# Patient Record
Sex: FEMALE | Race: OTHER | NOT HISPANIC OR LATINO
[De-identification: names, ages, dates, MRNs, and addresses within clinical notes are randomized per-mention and may not be internally consistent; named-entity substitution may affect disease eponyms.]

---

## 2024-02-13 ENCOUNTER — NON-APPOINTMENT (OUTPATIENT)
Age: 29
End: 2024-02-13

## 2024-02-14 ENCOUNTER — APPOINTMENT (OUTPATIENT)
Dept: OTOLARYNGOLOGY | Facility: CLINIC | Age: 29
End: 2024-02-14

## 2024-09-16 ENCOUNTER — TRANSCRIPTION ENCOUNTER (OUTPATIENT)
Age: 29
End: 2024-09-16

## 2024-09-17 ENCOUNTER — INPATIENT (INPATIENT)
Facility: HOSPITAL | Age: 29
LOS: 0 days | Discharge: HOME | DRG: 770 | End: 2024-09-18
Attending: OBSTETRICS & GYNECOLOGY | Admitting: OBSTETRICS & GYNECOLOGY
Payer: COMMERCIAL

## 2024-09-17 ENCOUNTER — TRANSCRIPTION ENCOUNTER (OUTPATIENT)
Age: 29
End: 2024-09-17

## 2024-09-17 VITALS
SYSTOLIC BLOOD PRESSURE: 112 MMHG | RESPIRATION RATE: 18 BRPM | HEART RATE: 76 BPM | TEMPERATURE: 98 F | WEIGHT: 100.09 LBS | DIASTOLIC BLOOD PRESSURE: 75 MMHG | OXYGEN SATURATION: 99 %

## 2024-09-17 VITALS
RESPIRATION RATE: 18 BRPM | OXYGEN SATURATION: 99 % | DIASTOLIC BLOOD PRESSURE: 61 MMHG | SYSTOLIC BLOOD PRESSURE: 104 MMHG | HEART RATE: 80 BPM

## 2024-09-17 LAB
ALBUMIN SERPL ELPH-MCNC: 4.9 G/DL — SIGNIFICANT CHANGE UP (ref 3.3–5)
ALP SERPL-CCNC: 68 U/L — SIGNIFICANT CHANGE UP (ref 40–120)
ALT FLD-CCNC: 10 U/L — SIGNIFICANT CHANGE UP (ref 10–45)
ANION GAP SERPL CALC-SCNC: 12 MMOL/L — SIGNIFICANT CHANGE UP (ref 5–17)
APTT BLD: 34 SEC — SIGNIFICANT CHANGE UP (ref 24.5–35.6)
AST SERPL-CCNC: 15 U/L — SIGNIFICANT CHANGE UP (ref 10–40)
BASOPHILS # BLD AUTO: 0.05 K/UL — SIGNIFICANT CHANGE UP (ref 0–0.2)
BASOPHILS NFR BLD AUTO: 0.6 % — SIGNIFICANT CHANGE UP (ref 0–2)
BILIRUB SERPL-MCNC: 0.2 MG/DL — SIGNIFICANT CHANGE UP (ref 0.2–1.2)
BUN SERPL-MCNC: 10 MG/DL — SIGNIFICANT CHANGE UP (ref 7–23)
CALCIUM SERPL-MCNC: 9.7 MG/DL — SIGNIFICANT CHANGE UP (ref 8.4–10.5)
CHLORIDE SERPL-SCNC: 102 MMOL/L — SIGNIFICANT CHANGE UP (ref 96–108)
CO2 SERPL-SCNC: 26 MMOL/L — SIGNIFICANT CHANGE UP (ref 22–31)
CREAT SERPL-MCNC: 0.72 MG/DL — SIGNIFICANT CHANGE UP (ref 0.5–1.3)
EGFR: 117 ML/MIN/1.73M2 — SIGNIFICANT CHANGE UP
EOSINOPHIL # BLD AUTO: 0.45 K/UL — SIGNIFICANT CHANGE UP (ref 0–0.5)
EOSINOPHIL NFR BLD AUTO: 5.4 % — SIGNIFICANT CHANGE UP (ref 0–6)
GLUCOSE SERPL-MCNC: 95 MG/DL — SIGNIFICANT CHANGE UP (ref 70–99)
HCG SERPL-ACNC: 220 MIU/ML — HIGH
HCT VFR BLD CALC: 36.7 % — SIGNIFICANT CHANGE UP (ref 34.5–45)
HGB BLD-MCNC: 12 G/DL — SIGNIFICANT CHANGE UP (ref 11.5–15.5)
IMM GRANULOCYTES NFR BLD AUTO: 0.8 % — SIGNIFICANT CHANGE UP (ref 0–0.9)
INR BLD: 1 — SIGNIFICANT CHANGE UP (ref 0.85–1.18)
LYMPHOCYTES # BLD AUTO: 2.33 K/UL — SIGNIFICANT CHANGE UP (ref 1–3.3)
LYMPHOCYTES # BLD AUTO: 27.8 % — SIGNIFICANT CHANGE UP (ref 13–44)
MCHC RBC-ENTMCNC: 28.9 PG — SIGNIFICANT CHANGE UP (ref 27–34)
MCHC RBC-ENTMCNC: 32.7 GM/DL — SIGNIFICANT CHANGE UP (ref 32–36)
MCV RBC AUTO: 88.4 FL — SIGNIFICANT CHANGE UP (ref 80–100)
MONOCYTES # BLD AUTO: 0.67 K/UL — SIGNIFICANT CHANGE UP (ref 0–0.9)
MONOCYTES NFR BLD AUTO: 8 % — SIGNIFICANT CHANGE UP (ref 2–14)
NEUTROPHILS # BLD AUTO: 4.81 K/UL — SIGNIFICANT CHANGE UP (ref 1.8–7.4)
NEUTROPHILS NFR BLD AUTO: 57.4 % — SIGNIFICANT CHANGE UP (ref 43–77)
NRBC # BLD: 0 /100 WBCS — SIGNIFICANT CHANGE UP (ref 0–0)
PLATELET # BLD AUTO: 317 K/UL — SIGNIFICANT CHANGE UP (ref 150–400)
POTASSIUM SERPL-MCNC: 3.9 MMOL/L — SIGNIFICANT CHANGE UP (ref 3.5–5.3)
POTASSIUM SERPL-SCNC: 3.9 MMOL/L — SIGNIFICANT CHANGE UP (ref 3.5–5.3)
PROT SERPL-MCNC: 8.1 G/DL — SIGNIFICANT CHANGE UP (ref 6–8.3)
PROTHROM AB SERPL-ACNC: 11.4 SEC — SIGNIFICANT CHANGE UP (ref 9.5–13)
RBC # BLD: 4.15 M/UL — SIGNIFICANT CHANGE UP (ref 3.8–5.2)
RBC # FLD: 12.6 % — SIGNIFICANT CHANGE UP (ref 10.3–14.5)
SODIUM SERPL-SCNC: 140 MMOL/L — SIGNIFICANT CHANGE UP (ref 135–145)
WBC # BLD: 8.38 K/UL — SIGNIFICANT CHANGE UP (ref 3.8–10.5)
WBC # FLD AUTO: 8.38 K/UL — SIGNIFICANT CHANGE UP (ref 3.8–10.5)

## 2024-09-17 PROCEDURE — 76830 TRANSVAGINAL US NON-OB: CPT | Mod: 26

## 2024-09-17 PROCEDURE — 76856 US EXAM PELVIC COMPLETE: CPT | Mod: 26

## 2024-09-17 PROCEDURE — 99285 EMERGENCY DEPT VISIT HI MDM: CPT

## 2024-09-17 PROCEDURE — 88305 TISSUE EXAM BY PATHOLOGIST: CPT | Mod: 26

## 2024-09-17 RX ORDER — METOCLOPRAMIDE HCL 5 MG
10 TABLET ORAL EVERY 8 HOURS
Refills: 0 | Status: DISCONTINUED | OUTPATIENT
Start: 2024-09-17 | End: 2024-09-18

## 2024-09-17 RX ORDER — ONDANSETRON 2 MG/ML
8 INJECTION, SOLUTION INTRAMUSCULAR; INTRAVENOUS EVERY 8 HOURS
Refills: 0 | Status: DISCONTINUED | OUTPATIENT
Start: 2024-09-17 | End: 2024-09-18

## 2024-09-17 RX ORDER — OXYCODONE HYDROCHLORIDE 5 MG/1
5 TABLET ORAL EVERY 4 HOURS
Refills: 0 | Status: DISCONTINUED | OUTPATIENT
Start: 2024-09-17 | End: 2024-09-18

## 2024-09-17 RX ORDER — ACETAMINOPHEN 325 MG/1
1000 TABLET ORAL EVERY 6 HOURS
Refills: 0 | Status: DISCONTINUED | OUTPATIENT
Start: 2024-09-17 | End: 2024-09-18

## 2024-09-17 RX ORDER — KETOROLAC TROMETHAMINE 30 MG/ML
30 INJECTION, SOLUTION INTRAMUSCULAR EVERY 8 HOURS
Refills: 0 | Status: DISCONTINUED | OUTPATIENT
Start: 2024-09-17 | End: 2024-09-18

## 2024-09-17 RX ORDER — HYDROMORPHONE HYDROCHLORIDE 2 MG/1
0.5 TABLET ORAL
Refills: 0 | Status: DISCONTINUED | OUTPATIENT
Start: 2024-09-17 | End: 2024-09-18

## 2024-09-17 RX ORDER — PANTOPRAZOLE SODIUM 40 MG
20 TABLET, DELAYED RELEASE (ENTERIC COATED) ORAL DAILY
Refills: 0 | Status: DISCONTINUED | OUTPATIENT
Start: 2024-09-17 | End: 2024-09-18

## 2024-09-17 RX ORDER — OXYCODONE HYDROCHLORIDE 5 MG/1
10 TABLET ORAL EVERY 4 HOURS
Refills: 0 | Status: DISCONTINUED | OUTPATIENT
Start: 2024-09-17 | End: 2024-09-18

## 2024-09-17 RX ADMIN — KETOROLAC TROMETHAMINE 30 MILLIGRAM(S): 30 INJECTION, SOLUTION INTRAMUSCULAR at 23:59

## 2024-09-17 RX ADMIN — KETOROLAC TROMETHAMINE 30 MILLIGRAM(S): 30 INJECTION, SOLUTION INTRAMUSCULAR at 23:17

## 2024-09-17 RX ADMIN — HYDROMORPHONE HYDROCHLORIDE 0.5 MILLIGRAM(S): 2 TABLET ORAL at 22:09

## 2024-09-17 RX ADMIN — HYDROMORPHONE HYDROCHLORIDE 0.5 MILLIGRAM(S): 2 TABLET ORAL at 23:18

## 2024-09-17 NOTE — CONSULT NOTE ADULT - SUBJECTIVE AND OBJECTIVE BOX
27yo  POD5 s/p D&C for MAB after failing medical tx on .  Pt comes into the ED complaining of heavy VB since yesterday.  Pt states yesterday afternoon she bled through 3 pads in one hour then completely stopped bleeding. Pt states she went to work today and then again started to bleed heavily.  Pt states she has been soaking through her pads and even bled through her pants this morning. Pt denies fever, chills, chest pain, SOB,nausea, vomiting.    Of note pt became dizzy during speculum exam.  Pt felt like "she was going to pass out" then 1 second later after being laid flat completely states she was "fine".  Given alcohol pad and ice packs assisted by cornelio Cummings and pt was stable. RN took VS stable. Pt now recovered completely and is asymptomatic.     OB/GYN Hx: G1: VTOP 2019 med. G2: MAB  took mife/miso, failed on  had D&C for retained POC  Last PAP smear: wnl, chlamydia  treated.     PMHx: denies  SHx: D&C  w/ Dr Cm  Meds: migraine med ([place]ajik name unk)   Allergies: NKDA    Social hx: has not been sexually active since this situation, has partner, was actively trying to get pregnant.     PHYSICAL EXAM:   Vital Signs Last 24 Hrs  T(C): 36.6 (17 Sep 2024 15:29), Max: 36.6 (17 Sep 2024 15:29)  T(F): 97.9 (17 Sep 2024 15:29), Max: 97.9 (17 Sep 2024 15:29)  HR: 70 (17 Sep 2024 16:50) (70 - 76)  BP: 105/74 (17 Sep 2024 16:50) (105/74 - 112/75)  BP(mean): --  RR: 16 (17 Sep 2024 16:50) (16 - 18)  SpO2: 99% (17 Sep 2024 16:50) (99% - 99%)    Parameters below as of 17 Sep 2024 16:50  Patient On (Oxygen Delivery Method): room air        **************************  Constitutional: Alert & Oriented x3, No acute distress  Respiratory: Clear to ausculation bilaterally; no wheezing, rhonchi, or crackles  Cardiovascular: regular rate and rhythm, no murmurs, or gallops  Gastrointestinal: soft, non tender, positive bowel sounds, no rebound or guarding   Pelvic exam: cervix closed, some mucus & blood coming from cervix, about 5cc in vault removed. pain on examination limited time.  Extremities: no calf tenderness or swelling    LABS:                        12.0   8.38  )-----------( 317      ( 17 Sep 2024 16:38 )             36.7         140  |  102  |  10  ----------------------------<  95  3.9   |  26  |  0.72    Ca    9.7      17 Sep 2024 16:38    TPro  8.1  /  Alb  4.9  /  TBili  0.2  /  DBili  x   /  AST  15  /  ALT  10  /  AlkPhos  68      PT/INR - ( 17 Sep 2024 16:38 )   PT: 11.4 sec;   INR: 1.00          PTT - ( 17 Sep 2024 16:38 )  PTT:34.0 sec  Urinalysis Basic - ( 17 Sep 2024 16:38 )    Color: x / Appearance: x / SG: x / pH: x  Gluc: 95 mg/dL / Ketone: x  / Bili: x / Urobili: x   Blood: x / Protein: x / Nitrite: x   Leuk Esterase: x / RBC: x / WBC x   Sq Epi: x / Non Sq Epi: x / Bacteria: x      HCG Quantitative, Serum: 220 mIU/mL ( @ 16:38)      RADIOLOGY & ADDITIONAL STUDIES:

## 2024-09-17 NOTE — H&P ADULT - HISTORY OF PRESENT ILLNESS
29yo  POD5 s/p D&C for MAB after failing medical tx on .  Pt comes into the ED complaining of heavy VB since yesterday.  Pt states yesterday afternoon she bled through 3 pads in one hour then completely stopped bleeding. Pt states she went to work today and then again started to bleed heavily.  Pt states she has been soaking through her pads and even bled through her pants this morning. Pt denies fever, chills, chest pain, SOB,nausea, vomiting.    Of note pt became dizzy during speculum exam.  Pt felt like "she was going to pass out" then 1 second later after being laid flat completely states she was "fine".  Given alcohol pad and ice packs assisted by cornelio Cummings and pt was stable. RN took VS stable. Pt now recovered completely and is asymptomatic.     OB/GYN Hx: G1: VTOP 2019 med. G2: MAB  took mife/miso, failed on  had D&C for retained POC  Last PAP smear: wnl, chlamydia  treated.     PMHx: denies  SHx: D&C  w/ Dr Cm  Meds: migraine med ([place] name unk)   Allergies: NKDA    Social hx: has not been sexually active since this situation, has partner, was actively trying to get pregnant.     PHYSICAL EXAM:   Vital Signs Last 24 Hrs  T(C): 36.6 (17 Sep 2024 15:29), Max: 36.6 (17 Sep 2024 15:29)  T(F): 97.9 (17 Sep 2024 15:29), Max: 97.9 (17 Sep 2024 15:29)  HR: 70 (17 Sep 2024 16:50) (70 - 76)  BP: 105/74 (17 Sep 2024 16:50) (105/74 - 112/75)  BP(mean): --  RR: 16 (17 Sep 2024 16:50) (16 - 18)  SpO2: 99% (17 Sep 2024 16:50) (99% - 99%)    Parameters below as of 17 Sep 2024 16:50  Patient On (Oxygen Delivery Method): room air        **************************  Constitutional: Alert & Oriented x3, No acute distress  Respiratory: Clear to ausculation bilaterally; no wheezing, rhonchi, or crackles  Cardiovascular: regular rate and rhythm, no murmurs, or gallops  Gastrointestinal: soft, non tender, positive bowel sounds, no rebound or guarding   Pelvic exam: cervix closed, some mucus & blood coming from cervix, about 5cc in vault removed. pain on examination limited time.  Extremities: no calf tenderness or swelling    LABS:                        12.0   8.38  )-----------( 317      ( 17 Sep 2024 16:38 )             36.7         140  |  102  |  10  ----------------------------<  95  3.9   |  26  |  0.72    Ca    9.7      17 Sep 2024 16:38    TPro  8.1  /  Alb  4.9  /  TBili  0.2  /  DBili  x   /  AST  15  /  ALT  10  /  AlkPhos  68      PT/INR - ( 17 Sep 2024 16:38 )   PT: 11.4 sec;   INR: 1.00          PTT - ( 17 Sep 2024 16:38 )  PTT:34.0 sec  Urinalysis Basic - ( 17 Sep 2024 16:38 )    Color: x / Appearance: x / SG: x / pH: x  Gluc: 95 mg/dL / Ketone: x  / Bili: x / Urobili: x   Blood: x / Protein: x / Nitrite: x   Leuk Esterase: x / RBC: x / WBC x   Sq Epi: x / Non Sq Epi: x / Bacteria: x      HCG Quantitative, Serum: 220 mIU/mL ( @ 16:38)      RADIOLOGY & ADDITIONAL STUDIES:      Assessment and Recommendation:   · Assessment	  29yo  POD5 s/p D&C for MAB after failing medical tx   -H/H stable, VSS   -No heavy VB seen, speculum exam as above   -Official US ordered   -Dr Cm evaluated the patient in the ER but was unable to perform a transvaginal US properly due to lack of supplies, etc.   -Dr Cm in agreement with plan     Addendum  20:15  TVUS suspicious for retained products of conception patient counseled on options regarding medication and surgical management. Patient ultimately decided for suction D&C, all questions answered and patient expressed understanding. Discussed with chief resident Dr. Ying and attending Dr. Cm will proceed to OR and anticipate d/c after PACU conditions are met.     NS PGY2

## 2024-09-17 NOTE — ED ADULT NURSE NOTE - OBJECTIVE STATEMENT
28y F presents to ED c/o pregnancy problem. Pt reports miscarriage () s/p methotrexate x3 weeks ago, with continued bleeding resulting in D&C last week. Pt reports bleeding improved, though yesterday began changing pad q20min, and today began bleeding heavier through her clothing and onto floor. + lightheadedness, dizziness. Denies syncope, N/V/D, pain. Pt AAOx4, speaking in full and clear sentences, NAD at this time.

## 2024-09-17 NOTE — PROGRESS NOTE ADULT - SUBJECTIVE AND OBJECTIVE BOX
Pt seen and examined at bedside. Pt complains of mild abdominal pain.   Pt denies any fever, chills, chest pain, SOB, nausea or vomiting     T(F): 97.6 (09-17-24 @ 21:32), Max: 97.9 (09-17-24 @ 15:29)  HR: 70 (09-17-24 @ 22:47) (58 - 94)  BP: 118/70 (09-17-24 @ 22:47) (105/74 - 136/89)  RR: 28 (09-17-24 @ 22:47) (14 - 35)  SpO2: 99% (09-17-24 @ 22:47) (94% - 100%)  Wt(kg): --      acetaminophen     Tablet .. 1000 milliGRAM(s) Oral every 6 hours  HYDROmorphone  Injectable 0.5 milliGRAM(s) IV Push every 15 minutes PRN Severe Pain (7 - 10)  ketorolac   Injectable 30 milliGRAM(s) IV Push every 8 hours  lactated ringers. 1000 milliLiter(s) IV Continuous <Continuous>  metoclopramide Injectable 10 milliGRAM(s) IV Push every 8 hours PRN Nausea and/or Vomiting  ondansetron Injectable 8 milliGRAM(s) IV Push every 8 hours PRN Nausea and/or Vomiting  oxyCODONE    IR 10 milliGRAM(s) Oral every 4 hours PRN Severe Pain (7 - 10)  oxyCODONE    IR 5 milliGRAM(s) Oral every 4 hours PRN Moderate Pain (4 - 6)  pantoprazole  Injectable 20 milliGRAM(s) IV Push daily  simethicone 80 milliGRAM(s) Chew every 6 hours PRN Gas      Physical exam:  Constitutional: NAD  Abdomen: soft, non-tender nondistended  Extremities: no lower extremity edema, or calve tenderness. SCDs in place    A:   28y, s/p  suction D&C         . Uncomplicated.   Post operative check performed.    Plan:  1. Vital signs stable, continue to monitor per protocol  2. Pain control Tylenol/Toradol, Oxycodone PRN for BTP.  3. DVT prophylaxis: SCDs  4. CV: IVH   5. Pulm: Incentive spirometer (at least 10 times per hour while awake)   6. GI: regular diet   7. : s/p gan, f/u tov  8. Activity: ambulate as tolerated

## 2024-09-17 NOTE — ED ADULT TRIAGE NOTE - CHIEF COMPLAINT QUOTE
Patient to the ED c/o  vaginal bleeding since D&C on Thursday, endorses miscarriage x 1 month ago @ 6 weeks gestation. Skin color appropriate, AAOX4, NAD.

## 2024-09-17 NOTE — ED PROVIDER NOTE - OBJECTIVE STATEMENT
here with vaginal bleeding x 4 days post d and c for miscarriage last Thursday. Had medical treatment 3 weeks ago that didn't work leading to d and c. Now increased pain and bleeding. Reports soaked through pants with clot earlier today. No fever/ chills. Talked to her obgyn and told to come to ED>

## 2024-09-17 NOTE — BRIEF OPERATIVE NOTE - OPERATION/FINDINGS
After informed consent was obtained, the patient was taken to the operating room, where she underwent anesthesia. The patient was prepped and draped in the normal sterile fashion in the dorsal lithotomy position. The bladder was drained. A speculum was placed in the posterior vagina, and a retractor was placed in the anterior vagina. The anterior lip of the cervix was grasped with a single-tooth tenaculum. The cervix was serially dilated to a #15 dilator. Polyp forceps were used to obtain products of conception. A suction curette advanced gently to the uterine fundus, the suction device was then activated and the curette rotated to clear the uterus until a gritty texture was noted. There was minimal bleeding noted and the tenaculum was then removed, with good hemostasis. The patient was then taken out of high stirrups, awakened, and transferred to the recovery room in stable condition. Specimens were submitted to pathology. The patient tolerated the procedure well.

## 2024-09-17 NOTE — BRIEF OPERATIVE NOTE - NSICDXBRIEFPREOP_GEN_ALL_CORE_FT
PRE-OP DIAGNOSIS:  Retained products of conception after miscarriage 17-Sep-2024 21:43:25  Coco Patricia

## 2024-09-17 NOTE — BRIEF OPERATIVE NOTE - NSICDXBRIEFPOSTOP_GEN_ALL_CORE_FT
POST-OP DIAGNOSIS:  Retained products of conception after miscarriage 17-Sep-2024 21:43:37  Coco Patricia

## 2024-09-17 NOTE — CONSULT NOTE ADULT - ASSESSMENT
29yo  POD5 s/p D&C for MAB after failing medical tx   -H/H stable, VSS   -No heavy VB seen, speculum exam as above   -Official US ordered   -Dr mC to do bedside US  -Dr Cm in agreement with plan  29yo  POD5 s/p D&C for MAB after failing medical tx   -H/H stable, VSS   -No heavy VB seen, speculum exam as above   -Official US ordered   -Dr Cm evaluated the patient in the ER but was unable to perform a transvaginal US properly due to lack of supplies, etc.   -Dr Cm in agreement with plan  27yo  POD5 s/p D&C for MAB after failing medical tx   -H/H stable, VSS   -No heavy VB seen, speculum exam as above   -Official US ordered   -Dr Cm evaluated the patient in the ER but was unable to perform a transvaginal US properly due to lack of supplies, etc.   -Dr Cm in agreement with plan     Addendum  20:15  TVUS suspicious for retained products of conception patient counseled on options regarding medication and surgical management. Patient ultimately decided for suction D&C, all questions answered and patient expressed understanding. Discussed with chief resident Dr. Ying and attending Dr. Cm will proceed to OR and anticipate d/c after PACU conditions are met.     NS PGY2

## 2024-09-17 NOTE — BRIEF OPERATIVE NOTE - NSICDXBRIEFPROCEDURE_GEN_ALL_CORE_FT
PROCEDURES:  Dilation and curettage of uterus using suction for incomplete  17-Sep-2024 21:43:06  Coco Patricia

## 2024-09-17 NOTE — ED PROVIDER NOTE - CLINICAL SUMMARY MEDICAL DECISION MAKING FREE TEXT BOX
bleeding post d and c concerning for retained poc, less likely infection. labs/us ordered. gyn consulted  per gyn, admit for OR

## 2024-09-18 PROCEDURE — 76830 TRANSVAGINAL US NON-OB: CPT

## 2024-09-18 PROCEDURE — 85610 PROTHROMBIN TIME: CPT

## 2024-09-18 PROCEDURE — 99285 EMERGENCY DEPT VISIT HI MDM: CPT | Mod: 25

## 2024-09-18 PROCEDURE — 36415 COLL VENOUS BLD VENIPUNCTURE: CPT

## 2024-09-18 PROCEDURE — 88305 TISSUE EXAM BY PATHOLOGIST: CPT

## 2024-09-18 PROCEDURE — 85025 COMPLETE CBC W/AUTO DIFF WBC: CPT

## 2024-09-18 PROCEDURE — 85730 THROMBOPLASTIN TIME PARTIAL: CPT

## 2024-09-18 PROCEDURE — 84702 CHORIONIC GONADOTROPIN TEST: CPT

## 2024-09-18 PROCEDURE — 80053 COMPREHEN METABOLIC PANEL: CPT

## 2024-09-18 PROCEDURE — 76856 US EXAM PELVIC COMPLETE: CPT

## 2024-09-18 RX ADMIN — ACETAMINOPHEN 1000 MILLIGRAM(S): 325 TABLET ORAL at 00:00

## 2024-09-18 RX ADMIN — ACETAMINOPHEN 1000 MILLIGRAM(S): 325 TABLET ORAL at 00:23

## 2024-09-18 RX ADMIN — Medication 80 MILLIGRAM(S): at 00:00

## 2024-09-19 ENCOUNTER — TRANSCRIPTION ENCOUNTER (OUTPATIENT)
Age: 29
End: 2024-09-19

## 2024-09-23 LAB — SURGICAL PATHOLOGY STUDY: SIGNIFICANT CHANGE UP

## 2024-09-24 DIAGNOSIS — Z86.19 PERSONAL HISTORY OF OTHER INFECTIOUS AND PARASITIC DISEASES: ICD-10-CM

## 2024-09-24 DIAGNOSIS — O07.4 FAILED ATTEMPTED TERMINATION OF PREGNANCY WITHOUT COMPLICATION: ICD-10-CM

## 2025-02-20 PROBLEM — Z00.00 ENCOUNTER FOR PREVENTIVE HEALTH EXAMINATION: Status: ACTIVE | Noted: 2025-02-20

## 2025-02-25 ENCOUNTER — TRANSCRIPTION ENCOUNTER (OUTPATIENT)
Age: 30
End: 2025-02-25

## 2025-02-25 ENCOUNTER — OUTPATIENT (OUTPATIENT)
Dept: OUTPATIENT SERVICES | Facility: HOSPITAL | Age: 30
LOS: 1 days | Discharge: HOME | End: 2025-02-25
Payer: COMMERCIAL

## 2025-02-25 VITALS
DIASTOLIC BLOOD PRESSURE: 73 MMHG | OXYGEN SATURATION: 98 % | TEMPERATURE: 100 F | SYSTOLIC BLOOD PRESSURE: 124 MMHG | HEIGHT: 61 IN | RESPIRATION RATE: 18 BRPM | HEART RATE: 75 BPM | WEIGHT: 100.53 LBS

## 2025-02-25 VITALS
RESPIRATION RATE: 15 BRPM | OXYGEN SATURATION: 98 % | DIASTOLIC BLOOD PRESSURE: 70 MMHG | HEART RATE: 75 BPM | SYSTOLIC BLOOD PRESSURE: 105 MMHG

## 2025-02-25 DIAGNOSIS — Z92.89 PERSONAL HISTORY OF OTHER MEDICAL TREATMENT: Chronic | ICD-10-CM

## 2025-02-25 DIAGNOSIS — Z98.890 OTHER SPECIFIED POSTPROCEDURAL STATES: Chronic | ICD-10-CM

## 2025-02-25 PROCEDURE — 88305 TISSUE EXAM BY PATHOLOGIST: CPT | Mod: 26

## 2025-02-25 DEVICE — AVETA FLEX RESECTING DEVICE 2.9MM: Type: IMPLANTABLE DEVICE | Site: BILATERAL | Status: FUNCTIONAL

## 2025-02-25 RX ORDER — ACETAMINOPHEN 500 MG/5ML
1000 LIQUID (ML) ORAL ONCE
Refills: 0 | Status: COMPLETED | OUTPATIENT
Start: 2025-02-25 | End: 2025-02-25

## 2025-02-25 RX ORDER — KETOROLAC TROMETHAMINE 30 MG/ML
30 INJECTION, SOLUTION INTRAMUSCULAR; INTRAVENOUS ONCE
Refills: 0 | Status: DISCONTINUED | OUTPATIENT
Start: 2025-02-25 | End: 2025-02-25

## 2025-02-25 RX ADMIN — Medication 1000 MILLIGRAM(S): at 15:23

## 2025-02-25 RX ADMIN — KETOROLAC TROMETHAMINE 30 MILLIGRAM(S): 30 INJECTION, SOLUTION INTRAMUSCULAR; INTRAVENOUS at 18:22

## 2025-02-25 RX ADMIN — KETOROLAC TROMETHAMINE 30 MILLIGRAM(S): 30 INJECTION, SOLUTION INTRAMUSCULAR; INTRAVENOUS at 18:46

## 2025-02-25 NOTE — BRIEF OPERATIVE NOTE - NSICDXBRIEFPROCEDURE_GEN_ALL_CORE_FT
PROCEDURES:  Hysteroscopy with dilation and curettage of uterus using hysteroscopic rotating cutter blade 25-Feb-2025 17:55:33  Chante Cm

## 2025-02-25 NOTE — ASU DISCHARGE PLAN (ADULT/PEDIATRIC) - FINANCIAL ASSISTANCE
[facility] provides services at a reduced cost to those who are determined to be eligible through [facility]’s financial assistance program. Information regarding [facility]’s financial assistance program can be found by going to [email or web address] or by calling [phone].

## 2025-02-25 NOTE — ASU DISCHARGE PLAN (ADULT/PEDIATRIC) - CALL YOUR DOCTOR IF YOU HAVE ANY OF THE FOLLOWING:
Bleeding that does not stop/Pain not relieved by Medications/Fever greater than (need to indicate Fahrenheit or Celsius) Albendazole Pregnancy And Lactation Text: This medication is Pregnancy Category C and it isn't known if it is safe during pregnancy. It is also excreted in breast milk.

## 2025-02-25 NOTE — ASU DISCHARGE PLAN (ADULT/PEDIATRIC) - NS MD DC FALL RISK RISK
For information on Fall & Injury Prevention, visit: [email or web address] OR  [email or web address] OR  https://www.cdc.gov/steadi/patient.html

## 2025-02-25 NOTE — ASU DISCHARGE PLAN (ADULT/PEDIATRIC) - CARE PROVIDER_API CALL
Chante Cm  Obstetrics and Gynecology  [address] [place] [address], [address]  [place] [address]  Phone: [phone]  Fax: [phone]  Follow Up Time:

## 2025-02-25 NOTE — BRIEF OPERATIVE NOTE - OPERATION/FINDINGS
Normal external genitalia. Anterior-posterior intrauterine adhesions, after resection a normal appearing endometrial cavity with bilateral tubal ostia.

## 2025-05-27 ENCOUNTER — APPOINTMENT (OUTPATIENT)
Dept: ANTEPARTUM | Facility: CLINIC | Age: 30
End: 2025-05-27
Payer: COMMERCIAL

## 2025-05-27 ENCOUNTER — ASOB RESULT (OUTPATIENT)
Age: 30
End: 2025-05-27

## 2025-05-27 PROCEDURE — 93976 VASCULAR STUDY: CPT

## 2025-05-27 PROCEDURE — 76801 OB US < 14 WKS SINGLE FETUS: CPT

## 2025-05-27 PROCEDURE — 76813 OB US NUCHAL MEAS 1 GEST: CPT

## 2025-07-21 ENCOUNTER — ASOB RESULT (OUTPATIENT)
Age: 30
End: 2025-07-21

## 2025-07-21 ENCOUNTER — APPOINTMENT (OUTPATIENT)
Dept: ANTEPARTUM | Facility: CLINIC | Age: 30
End: 2025-07-21
Payer: COMMERCIAL

## 2025-07-21 PROCEDURE — 76817 TRANSVAGINAL US OBSTETRIC: CPT

## 2025-07-21 PROCEDURE — 76811 OB US DETAILED SNGL FETUS: CPT

## 2025-09-15 ENCOUNTER — ASOB RESULT (OUTPATIENT)
Age: 30
End: 2025-09-15

## 2025-09-15 ENCOUNTER — APPOINTMENT (OUTPATIENT)
Dept: ANTEPARTUM | Facility: CLINIC | Age: 30
End: 2025-09-15
Payer: COMMERCIAL

## 2025-09-15 PROCEDURE — 76819 FETAL BIOPHYS PROFIL W/O NST: CPT | Mod: 59

## 2025-09-15 PROCEDURE — 76820 UMBILICAL ARTERY ECHO: CPT | Mod: 59

## 2025-09-15 PROCEDURE — 76816 OB US FOLLOW-UP PER FETUS: CPT

## (undated) DEVICE — POSITIONER STRAP ARMBOARD 1.5X32" DISP

## (undated) DEVICE — DRSG PAD SANITARY OB

## (undated) DEVICE — AVETA FLUID MANAGEMENT ACCESSORY

## (undated) DEVICE — DRAPE GYN IRRIGATION POUCH 19 X 23"

## (undated) DEVICE — ACCESSORY AVETA WASTE MANAGEMENT 3MM

## (undated) DEVICE — SOL INJ NS 0.9% 1000ML

## (undated) DEVICE — VENODYNE/SCD SLEEVE CALF MEDIUM

## (undated) DEVICE — VACUUM CURETTE BUSSE HOSP CURVED 7MM

## (undated) DEVICE — GLV 7 PROTEXIS (WHITE)

## (undated) DEVICE — WARMING BLANKET UPPER ADULT

## (undated) DEVICE — DRSG COMBINE 5X9"

## (undated) DEVICE — TUBING MICROAIRE ASPIRATION SET 12FT

## (undated) DEVICE — PACK D&C

## (undated) DEVICE — AVETA CORAL HYSTEROSCOPE 4.6MM DISP

## (undated) DEVICE — PRESSURE INFUSOR BAG 3000ML

## (undated) DEVICE — TUBING SET GRAVITY 2 SPIKE

## (undated) DEVICE — POSITIONER FOAM EGG CRATE ULNAR 2PCS (PINK)